# Patient Record
Sex: FEMALE | Race: BLACK OR AFRICAN AMERICAN | Employment: FULL TIME | URBAN - METROPOLITAN AREA
[De-identification: names, ages, dates, MRNs, and addresses within clinical notes are randomized per-mention and may not be internally consistent; named-entity substitution may affect disease eponyms.]

---

## 2018-12-07 ENCOUNTER — OFFICE VISIT (OUTPATIENT)
Dept: FAMILY MEDICINE CLINIC | Age: 43
End: 2018-12-07

## 2018-12-07 VITALS
SYSTOLIC BLOOD PRESSURE: 131 MMHG | HEART RATE: 60 BPM | WEIGHT: 185 LBS | HEIGHT: 63 IN | TEMPERATURE: 97.5 F | BODY MASS INDEX: 32.78 KG/M2 | DIASTOLIC BLOOD PRESSURE: 72 MMHG

## 2018-12-07 DIAGNOSIS — G43.109 MIGRAINE WITH AURA AND WITHOUT STATUS MIGRAINOSUS, NOT INTRACTABLE: Primary | ICD-10-CM

## 2018-12-07 DIAGNOSIS — G43.909 MIGRAINE WITHOUT STATUS MIGRAINOSUS, NOT INTRACTABLE, UNSPECIFIED MIGRAINE TYPE: ICD-10-CM

## 2018-12-07 RX ORDER — BUTALBITAL, ACETAMINOPHEN AND CAFFEINE 50; 325; 40 MG/1; MG/1; MG/1
1 TABLET ORAL
Qty: 20 TAB | Refills: 0 | Status: SHIPPED | OUTPATIENT
Start: 2018-12-07 | End: 2019-11-06

## 2018-12-07 NOTE — PROGRESS NOTES
Assessment/Plan:       ICD-10-CM ICD-9-CM    1. Migraine with aura and without status migrainosus, not intractable G43.109 346.00    2. Migraine without status migrainosus, not intractable, unspecified migraine type G43.909 346.90 butalbital-acetaminophen-caffeine (FIORICET, ESGIC) -40 mg per tablet     Follow-up Disposition:  Return if symptoms worsen or fail to improve - to [facility] ER for signs/symptoms stroke as discussed. [facility], [address] [place] [address]  [address] [id number]  Phone: [phone] Fax: [phone]      [facility]  Subjective:     Chief Complaint   Patient presents with    Head Pain     pt c/o right side head and eye intermittent pain x 1 month    Other     please check depression screening    Zach Sigala is a 39 y.o. BLACK OR  female. HPI: Eye painful, sometimes turns red. No head injury. Treated for headaches that wouldn  t go away 9 years ago. She is in school. Her headaches went away for a year. 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 Maybe sleeps 5 hours. A week ago, had to get up very early at 3 am.  Felt tired. Took pain pills on Saturday. Sunday, headache not going away. Eye pain now 4/10. Pain has gone up to 7-8/10. Does not affect her vision. No n/v  Light doesn't bother her. Last used tylenol. No recent change in coffee. Review of Systems: Negative for: fever, chest pain, shortness of breath, leg swelling, exertional dyspnea, palpitations. Current Medications:   Current Outpatient Medications on File Prior to Visit   Medication Sig    butalbital-acetaminophen-caffeine (FIORICET, ESGIC) -40 mg per tablet Take 1 Tab by mouth every six (6) hours as needed for Pain. Max Daily Amount: 4 Tabs.  naproxen (NAPROSYN) 500 mg tablet Take 1 Tab by mouth two (2) times daily (with meals).     cyclobenzaprine (FLEXERIL) 10 mg tablet Take 1 Tab by mouth nightly. Social History: She  reports that  has never smoked. she has never used smokeless tobacco. She reports that she does not drink alcohol or use drugs. Objective:     Vitals:    12/07/18 0858   BP: 131/72   Pulse: 60   Temp: 97.5 °F (36.4 °C)   TempSrc: Oral   Weight: 185 lb (83.9 kg)   Height: 5' 2.99\" (1.6 m)    Patient's last menstrual period was 12/05/2018. Wt Readings from Last 2 Encounters:   12/07/18 185 lb (83.9 kg)   07/13/15 167 lb (75.8 kg)     Physical Examination:  General appearance - well developed, no acute distress. Chest - clear to auscultation. Heart - regular rate and rhythm without murmurs, rubs, or gallops. Abdomen - bowel sounds present x 4, NT, ND. Extremities - pulses intact. No peripheral edema. CN II-XII normal.  Assessment/Plan:   Diagnoses and all orders for this visit:    1. Migraine with aura and without status migrainosus, not intractable    2. Migraine without status migrainosus, not intractable, unspecified migraine type  -     butalbital-acetaminophen-caffeine (FIORICET, ESGIC) -40 mg per tablet; Take 1 Tab by mouth every six (6) hours as needed for Pain. Follow-up Disposition:  Return if symptoms worsen or fail to improve - to [facility] ER for signs/symptoms stroke as discussed. Kirti Le DNP, FNP-BC, BC-ADM  [address] 51 expressed understanding of this plan.

## 2019-11-06 ENCOUNTER — HOSPITAL ENCOUNTER (EMERGENCY)
Age: 44
Discharge: HOME | End: 2019-11-06
Attending: EMERGENCY MEDICINE | Admitting: EMERGENCY MEDICINE
Payer: SELF-PAY

## 2019-11-06 ENCOUNTER — APPOINTMENT (OUTPATIENT)
Dept: CT IMAGING | Age: 44
End: 2019-11-06
Attending: NURSE PRACTITIONER
Payer: SELF-PAY

## 2019-11-06 VITALS
RESPIRATION RATE: 15 BRPM | HEART RATE: 65 BPM | SYSTOLIC BLOOD PRESSURE: 107 MMHG | OXYGEN SATURATION: 100 % | TEMPERATURE: 98.1 F | DIASTOLIC BLOOD PRESSURE: 81 MMHG

## 2019-11-06 DIAGNOSIS — G43.909 MIGRAINE WITHOUT STATUS MIGRAINOSUS, NOT INTRACTABLE, UNSPECIFIED MIGRAINE TYPE: ICD-10-CM

## 2019-11-06 DIAGNOSIS — R51.9 ACUTE NONINTRACTABLE HEADACHE, UNSPECIFIED HEADACHE TYPE: Primary | ICD-10-CM

## 2019-11-06 LAB
ANION GAP SERPL CALC-SCNC: 5 MMOL/L (ref 5–15)
BASOPHILS # BLD: 0 K/UL (ref 0–0.1)
BASOPHILS NFR BLD: 0 % (ref 0–1)
BUN SERPL-MCNC: 16 MG/DL (ref 6–20)
BUN/CREAT SERPL: 16 (ref 12–20)
CALCIUM SERPL-MCNC: 8.6 MG/DL (ref 8.5–10.1)
CHLORIDE SERPL-SCNC: 107 MMOL/L (ref 97–108)
CO2 SERPL-SCNC: 24 MMOL/L (ref 21–32)
COMMENT, HOLDF: NORMAL
CREAT SERPL-MCNC: 1.02 MG/DL (ref 0.55–1.02)
DIFFERENTIAL METHOD BLD: NORMAL
EOSINOPHIL # BLD: 0.4 K/UL (ref 0–0.4)
EOSINOPHIL NFR BLD: 5 % (ref 0–7)
ERYTHROCYTE [DISTWIDTH] IN BLOOD BY AUTOMATED COUNT: 14.5 % (ref 11.5–14.5)
GLUCOSE SERPL-MCNC: 157 MG/DL (ref 65–100)
HCT VFR BLD AUTO: 36.3 % (ref 35–47)
HGB BLD-MCNC: 11.5 G/DL (ref 11.5–16)
IMM GRANULOCYTES # BLD AUTO: 0 K/UL (ref 0–0.04)
IMM GRANULOCYTES NFR BLD AUTO: 0 % (ref 0–0.5)
LYMPHOCYTES # BLD: 2.3 K/UL (ref 0.8–3.5)
LYMPHOCYTES NFR BLD: 30 % (ref 12–49)
MAGNESIUM SERPL-MCNC: 2 MG/DL (ref 1.6–2.4)
MCH RBC QN AUTO: 27.3 PG (ref 26–34)
MCHC RBC AUTO-ENTMCNC: 31.7 G/DL (ref 30–36.5)
MCV RBC AUTO: 86.2 FL (ref 80–99)
MONOCYTES # BLD: 0.4 K/UL (ref 0–1)
MONOCYTES NFR BLD: 5 % (ref 5–13)
NEUTS SEG # BLD: 4.6 K/UL (ref 1.8–8)
NEUTS SEG NFR BLD: 60 % (ref 32–75)
NRBC # BLD: 0 K/UL (ref 0–0.01)
NRBC BLD-RTO: 0 PER 100 WBC
PLATELET # BLD AUTO: 269 K/UL (ref 150–400)
PMV BLD AUTO: 11 FL (ref 8.9–12.9)
POTASSIUM SERPL-SCNC: 3.7 MMOL/L (ref 3.5–5.1)
RBC # BLD AUTO: 4.21 M/UL (ref 3.8–5.2)
SAMPLES BEING HELD,HOLD: NORMAL
SODIUM SERPL-SCNC: 136 MMOL/L (ref 136–145)
WBC # BLD AUTO: 7.7 K/UL (ref 3.6–11)

## 2019-11-06 PROCEDURE — 80048 BASIC METABOLIC PNL TOTAL CA: CPT

## 2019-11-06 PROCEDURE — 96375 TX/PRO/DX INJ NEW DRUG ADDON: CPT

## 2019-11-06 PROCEDURE — 99283 EMERGENCY DEPT VISIT LOW MDM: CPT

## 2019-11-06 PROCEDURE — 85025 COMPLETE CBC W/AUTO DIFF WBC: CPT

## 2019-11-06 PROCEDURE — 96374 THER/PROPH/DIAG INJ IV PUSH: CPT

## 2019-11-06 PROCEDURE — 83735 ASSAY OF MAGNESIUM: CPT

## 2019-11-06 PROCEDURE — 70450 CT HEAD/BRAIN W/O DYE: CPT

## 2019-11-06 PROCEDURE — 74011250636 HC RX REV CODE- 250/636: Performed by: NURSE PRACTITIONER

## 2019-11-06 PROCEDURE — 36415 COLL VENOUS BLD VENIPUNCTURE: CPT

## 2019-11-06 RX ORDER — KETOROLAC TROMETHAMINE 30 MG/ML
15 INJECTION, SOLUTION INTRAMUSCULAR; INTRAVENOUS
Status: COMPLETED | OUTPATIENT
Start: 2019-11-06 | End: 2019-11-06

## 2019-11-06 RX ORDER — ONDANSETRON 2 MG/ML
4 INJECTION INTRAMUSCULAR; INTRAVENOUS
Status: COMPLETED | OUTPATIENT
Start: 2019-11-06 | End: 2019-11-06

## 2019-11-06 RX ORDER — BUTALBITAL, ACETAMINOPHEN AND CAFFEINE 50; 325; 40 MG/1; MG/1; MG/1
1 TABLET ORAL
Qty: 20 TAB | Refills: 0 | Status: SHIPPED | OUTPATIENT
Start: 2019-11-06

## 2019-11-06 RX ADMIN — KETOROLAC TROMETHAMINE 15 MG: 30 INJECTION, SOLUTION INTRAMUSCULAR at 19:21

## 2019-11-06 RX ADMIN — SODIUM CHLORIDE 1000 ML: 900 INJECTION, SOLUTION INTRAVENOUS at 19:20

## 2019-11-06 RX ADMIN — ONDANSETRON 4 MG: 2 INJECTION INTRAMUSCULAR; INTRAVENOUS at 19:21

## 2019-11-06 NOTE — ED TRIAGE NOTES
Triage: Pt arrives from work with CC of pain and redness to the right eye as well as headache behind the eye and on that side of the head. Pt reports history of migraines. Denies any vision problems.  Denies N/V.

## 2019-11-06 NOTE — ED PROVIDER NOTES
Initial Complaint: headache, right eye redness    Started: today    Endorses: migraines are mostly in the back but over the last year it has been behind the right eye. Has been seen by Brooke and given \"pain killers\". Has been seen at [facility] for eye exam and her exam was normal.  Denies: H/O vision problems, N/V, photophobia. Made better: nothing  Made worse: when bending over, turning head to the right    No further complaints. No past medical history on file. Past Surgical History:  2010: HX GYN      Comment:  still birth  200: HX TUBAL LIGATION  Reviewed      Primary care provider: No primary care provider on file. The history is provided by the patient. No  was used. No past medical history on file. Past Surgical History:   Procedure Laterality Date    HX GYN  2010    still birth    HX TUBAL LIGATION  2010     No family history on file.     Social History     Socioeconomic History    Marital status:      Spouse name: Not on file    Number of children: Not on file    Years of education: Not on file    Highest education level: Not on file   Occupational History    Not on file   Social Needs    Financial resource strain: Not on file    Food insecurity:     Worry: Not on file     Inability: Not on file    Transportation needs:     Medical: Not on file     Non-medical: Not on file   Tobacco Use    Smoking status: Never Smoker    Smokeless tobacco: Never Used   Substance and Sexual Activity    Alcohol use: No    Drug use: No    Sexual activity: Yes     Partners: Male   Lifestyle    Physical activity:     Days per week: Not on file     Minutes per session: Not on file    Stress: Not on file   Relationships    Social connections:     Talks on phone: Not on file     Gets together: Not on file     Attends Taoist service: Not on file     Active member of club or organization: Not on file     Attends meetings of clubs or organizations: Not on file Relationship status: Not on file    Intimate partner violence:     Fear of current or ex partner: Not on file     Emotionally abused: Not on file     Physically abused: Not on file     Forced sexual activity: Not on file   Other Topics Concern    Not on file   Social History Narrative    Not on file     ALLERGIES: Patient has no known allergies. Review of Systems   Constitutional: Negative for chills and fever. Eyes: Positive for pain, redness and visual disturbance. Negative for photophobia. Gastrointestinal: Negative for nausea and vomiting. Neurological: Positive for headaches. Negative for dizziness and light-headedness. Psychiatric/Behavioral: Negative. All other systems reviewed and are negative. Vitals:    11/06/19 1718   BP: 128/76   Pulse: 85   Resp: 16   Temp: 97.8 °F (36.6 °C)   SpO2: 100%          Physical Exam   Constitutional: She is oriented to person, place, and time. Vital signs are normal. She appears well-developed and well-nourished. She is active and cooperative. Non-toxic appearance. She does not have a sickly appearance. She does not appear ill. No distress. HENT:   Head: Normocephalic and atraumatic. Eyes: Pupils are equal, round, and reactive to light. EOM and lids are normal. Right conjunctiva is injected. Right conjunctiva has no hemorrhage. Left conjunctiva is not injected. Left conjunctiva has no hemorrhage. Right eye: Injected to the lateral eye. No medial injection. NOT a sub-conjunctival hemorrhage   Neck: Trachea normal, normal range of motion and phonation normal. Neck supple. Cardiovascular: Normal rate, regular rhythm, normal heart sounds and intact distal pulses. Pulses:       Radial pulses are 2+ on the right side, and 2+ on the left side. Pulmonary/Chest: Effort normal and breath sounds normal. No stridor. No respiratory distress. She has no decreased breath sounds. She has no wheezes. She has no rhonchi. She has no rales.  She exhibits no tenderness. Abdominal: Soft. Normal appearance and bowel sounds are normal. There is no tenderness. There is no rigidity, no rebound, no guarding and no CVA tenderness. Musculoskeletal: Normal range of motion. Lymphadenopathy:        Head (right side): No submental, no submandibular, no tonsillar and no preauricular adenopathy present. Head (left side): No submental, no submandibular, no tonsillar and no preauricular adenopathy present. She has no cervical adenopathy. Neurological: She is alert and oriented to person, place, and time. She has normal strength. No cranial nerve deficit or sensory deficit. She displays a negative Romberg sign. GCS eye subscore is 4. GCS verbal subscore is 5. GCS motor subscore is 6. Non-focal neuro exam. Negative finger/nose and heel/shin. Negative pronator drift. Skin: Skin is warm and dry. No erythema. Psychiatric: She has a normal mood and affect. Her behavior is normal. Judgment and thought content normal.   Nursing note and vitals reviewed. MDM       Procedures      Assessment & Plan:     Orders Placed This Encounter    CT HEAD WO CONT    Hold Sample    CBC WITH AUTOMATED DIFF    MAGNESIUM    METABOLIC PANEL, BASIC    INSERT PERIPHERAL IV ONE TIME STAT    sodium chloride 0.9 % bolus infusion 1,000 mL    ondansetron (ZOFRAN) injection 4 mg    ketorolac (TORADOL) injection 15 mg       Discussed with Carolee Pepper MD,ED Provider    Joanie Larkin NP  11/06/19  6:09 PM    Pain now 5/10 from 10/10. Labs and imaging without acute findings. Follow-up with PCP. Discussed return precautions. 8:46 PM  Patient re-evaluated. All questions answered. Patient appropriate for discharge. Given return precautions and follow up instructions.      LABORATORY TESTS:  Labs Reviewed   METABOLIC PANEL, BASIC - Abnormal; Notable for the following components:       Result Value    Glucose 157 (*)     GFR est non-AA 59 (*)     All other components within normal limits   SAMPLES BEING HELD   CBC WITH AUTOMATED DIFF   MAGNESIUM       IMAGING RESULTS:  CT HEAD WO CONT   Final Result   IMPRESSION: No acute intracranial findings. Sinus disease. MEDICATIONS GIVEN:  Medications   sodium chloride 0.9 % bolus infusion 1,000 mL (0 mL IntraVENous IV Completed 11/6/19 2043)   ondansetron (ZOFRAN) injection 4 mg (4 mg IntraVENous Given 11/6/19 1921)   ketorolac (TORADOL) injection 15 mg (15 mg IntraVENous Given 11/6/19 1921)       IMPRESSION:  1. Acute nonintractable headache, unspecified headache type    2. Migraine without status migrainosus, not intractable, unspecified migraine type        PLAN:  1. Discharge Medication List as of 11/6/2019  8:43 PM      CONTINUE these medications which have CHANGED    Details   butalbital-acetaminophen-caffeine (FIORICET, ESGIC) -40 mg per tablet Take 1 Tab by mouth every six (6) hours as needed for Pain. Indications: a migraine headache, Tension Headache, Print, Disp-20 Tab, R-0         CONTINUE these medications which have NOT CHANGED    Details   naproxen (NAPROSYN) 500 mg tablet Take 1 Tab by mouth two (2) times daily (with meals). , Normal, Disp-20 Tab, R-0      cyclobenzaprine (FLEXERIL) 10 mg tablet Take 1 Tab by mouth nightly., Normal, Disp-15 Tab, R-0           2. Follow-up Information     Follow up With Specialties Details [address]  Schedule an appointment as soon as possible for a visit Primary care referral [phone]  [address] [facility] [place]  Schedule an appointment as soon as possible for a visit  [address] [facility] [address] [place] [address]  [phone]    [facility], [place] [address] [facility] [address] DEP Emergency Medicine  As needed, If symptoms worsen [address] [facility]  [phone]        3.      Return to ED for new or worsening symptoms       Ray Fernandez NP                  Please note that this dictation was completed with Dragon, the computer voice recognition software. Quite often unanticipated grammatical, syntax, homophones, and other interpretive errors are inadvertently transcribed by the computer software. Please disregard these errors. Please excuse any errors that have escaped final proofreading.

## 2019-11-07 NOTE — DISCHARGE INSTRUCTIONS
Thank you for allowing us to care for you today. Please follow-up with your Primary Care provider in the next 2-3 days if your symptoms do not improve. Plan for home:     Fioricet every 6 hours as needed for headache. Follow-up with Neurology. See below for more information. Come back to the ER if you have worsening symptoms, fevers over 100.9, shaking chills, nausea or vomiting. Patient Education        Headache: Care Instructions  Your Care Instructions    Headaches have many possible causes. Most headaches aren't a sign of a more serious problem, and they will get better on their own. Home treatment may help you feel better faster. The doctor has checked you carefully, but problems can develop later. If you notice any problems or new symptoms, get medical treatment right away. Follow-up care is a key part of your treatment and safety. Be sure to make and go to all appointments, and call your doctor if you are having problems. It's also a good idea to know your test results and keep a list of the medicines you take. How can you care for yourself at home? · Do not drive if you have taken a prescription pain medicine. · Rest in a quiet, dark room until your headache is gone. Close your eyes and try to relax or go to sleep. Don't watch TV or read. · Put a cold, moist cloth or cold pack on the painful area for 10 to 20 minutes at a time. Put a thin cloth between the cold pack and your skin. · Use a warm, moist towel or a heating pad set on low to relax tight shoulder and neck muscles. · Have someone gently massage your neck and shoulders. · Take pain medicines exactly as directed. ? If the doctor gave you a prescription medicine for pain, take it as prescribed. ? If you are not taking a prescription pain medicine, ask your doctor if you can take an over-the-counter medicine.   · Be careful not to take pain medicine more often than the instructions allow, because you may get worse or more frequent headaches when the medicine wears off. · Do not ignore new symptoms that occur with a headache, such as a fever, weakness or numbness, vision changes, or confusion. These may be signs of a more serious problem. To prevent headaches  · Keep a headache diary so you can figure out what triggers your headaches. Avoiding triggers may help you prevent headaches. Record when each headache began, how long it lasted, and what the pain was like (throbbing, aching, stabbing, or dull). Write down any other symptoms you had with the headache, such as nausea, flashing lights or dark spots, or sensitivity to bright light or loud noise. Note if the headache occurred near your period. List anything that might have triggered the headache, such as certain foods (chocolate, cheese, wine) or odors, smoke, bright light, stress, or lack of sleep. · Find healthy ways to deal with stress. Headaches are most common during or right after stressful times. Take time to relax before and after you do something that has caused a headache in the past.  · Try to keep your muscles relaxed by keeping good posture. Check your jaw, face, neck, and shoulder muscles for tension, and try relaxing them. When sitting at a desk, change positions often, and stretch for 30 seconds each hour. · Get plenty of sleep and exercise. · Eat regularly and well. Long periods without food can trigger a headache. · Treat yourself to a massage. Some people find that regular massages are very helpful in relieving tension. · Limit caffeine by not drinking too much coffee, tea, or soda. But don't quit caffeine suddenly, because that can also give you headaches. · Reduce eyestrain from computers by blinking frequently and looking away from the computer screen every so often. Make sure you have proper eyewear and that your monitor is set up properly, about an arm's length away. · Seek help if you have depression or anxiety.  Your headaches may be linked to these conditions. Treatment can both prevent headaches and help with symptoms of anxiety or depression. When should you call for help? Call 911 anytime you think you may need emergency care. For example, call if:    · You have signs of a stroke. These may include:  ? Sudden numbness, paralysis, or weakness in your face, arm, or leg, especially on only one side of your body. ? Sudden vision changes. ? Sudden trouble speaking. ? Sudden confusion or trouble understanding simple statements. ? Sudden problems with walking or balance. ? A sudden, severe headache that is different from past headaches.    Call your doctor now or seek immediate medical care if:    · You have a new or worse headache.     · Your headache gets much worse. Where can you learn more? Go to http://jules-enoch.info/. Enter M271 in the search box to learn more about \"Headache: Care Instructions. \"  Current as of: March 28, 2019  Content Version: 12.2  © 7005-1541 [employer], [employer]. Care instructions adapted under license by [employer] (which disclaims liability or warranty for this information). If you have questions about a medical condition or this instruction, always ask your healthcare professional. [place] [address] any warranty or liability for your use of this information.

## 2019-11-07 NOTE — ED NOTES
2000: Fluids continuing to infuse. 2043: Patient verbalizes understanding of discharge instructions given by provider, Josiah Choi 34. Opportunity for questions provided. Patient in no apparent distress. PIV removed. Patient ambulatory upon discharge.    Visit Vitals  /81 (BP 1 Location: Left arm, BP Patient Position: At rest;Sitting)   Pulse 65   Temp 98.1 °F (36.7 °C)   Resp 15   SpO2 100%

## 2021-05-17 ENCOUNTER — HOSPITAL ENCOUNTER (EMERGENCY)
Age: 46
Discharge: HOME | End: 2021-05-17
Attending: EMERGENCY MEDICINE | Admitting: EMERGENCY MEDICINE
Payer: COMMERCIAL

## 2021-05-17 VITALS
TEMPERATURE: 97 F | WEIGHT: 183 LBS | OXYGEN SATURATION: 98 % | HEIGHT: 63 IN | BODY MASS INDEX: 32.43 KG/M2 | SYSTOLIC BLOOD PRESSURE: 137 MMHG | HEART RATE: 78 BPM | RESPIRATION RATE: 18 BRPM | DIASTOLIC BLOOD PRESSURE: 101 MMHG

## 2021-05-17 DIAGNOSIS — H10.31 ACUTE CONJUNCTIVITIS OF RIGHT EYE, UNSPECIFIED ACUTE CONJUNCTIVITIS TYPE: ICD-10-CM

## 2021-05-17 DIAGNOSIS — H20.9 UVEITIS: Primary | ICD-10-CM

## 2021-05-17 PROCEDURE — 74011000250 HC RX REV CODE- 250: Performed by: EMERGENCY MEDICINE

## 2021-05-17 PROCEDURE — 99284 EMERGENCY DEPT VISIT MOD MDM: CPT

## 2021-05-17 PROCEDURE — 74011250637 HC RX REV CODE- 250/637: Performed by: EMERGENCY MEDICINE

## 2021-05-17 RX ORDER — ACETAMINOPHEN 500 MG
1000 TABLET ORAL
Status: COMPLETED | OUTPATIENT
Start: 2021-05-17 | End: 2021-05-17

## 2021-05-17 RX ORDER — IBUPROFEN 400 MG/1
800 TABLET ORAL
Status: COMPLETED | OUTPATIENT
Start: 2021-05-17 | End: 2021-05-17

## 2021-05-17 RX ORDER — ERYTHROMYCIN 5 MG/G
OINTMENT OPHTHALMIC
Qty: 1 TUBE | Refills: 0 | Status: SHIPPED | OUTPATIENT
Start: 2021-05-17

## 2021-05-17 RX ORDER — TETRACAINE HYDROCHLORIDE 5 MG/ML
2 SOLUTION OPHTHALMIC
Status: COMPLETED | OUTPATIENT
Start: 2021-05-17 | End: 2021-05-17

## 2021-05-17 RX ORDER — ERYTHROMYCIN 5 MG/G
OINTMENT OPHTHALMIC
Status: DISCONTINUED | OUTPATIENT
Start: 2021-05-17 | End: 2021-05-17

## 2021-05-17 RX ADMIN — TETRACAINE HYDROCHLORIDE 2 DROP: 5 SOLUTION OPHTHALMIC at 06:36

## 2021-05-17 RX ADMIN — ACETAMINOPHEN 1000 MG: 500 TABLET ORAL at 06:55

## 2021-05-17 RX ADMIN — IBUPROFEN 800 MG: 400 TABLET, FILM COATED ORAL at 06:55

## 2021-05-17 RX ADMIN — FLUORESCEIN SODIUM 1 STRIP: 1 STRIP OPHTHALMIC at 06:55

## 2021-05-17 NOTE — ED TRIAGE NOTES
Pt presents to the ED with cc of severe headache and right eye pain that started this morning. Pt denies vision changes.

## 2021-05-18 NOTE — ED PROVIDER NOTES
79-year-old female presenting ER with initial complaint of headache but truly actually right eye pain. Patient reports right eye pain with redness. No loss of vision. Patient denies any trauma injuries or foreign bodies or foreign body sensation. Denies any eye discharge. No fevers or chills. Does not wear contacts or glasses. No history of autoimmune disorders. Reports photophobia. Mild headache associated around the eye but no generalized headache. No neck stiffness or rigidity. No fevers or chills or nausea or vomiting. Does not seeing eye doctor regularly. No history of glaucoma or family history of glaucoma. No past medical history on file. Past Surgical History:   Procedure Laterality Date    HX GYN  2010    still birth    HX TUBAL LIGATION  2010         No family history on file.     Social History     Socioeconomic History    Marital status:      Spouse name: Not on file    Number of children: Not on file    Years of education: Not on file    Highest education level: Not on file   Occupational History    Not on file   Social Needs    Financial resource strain: Not on file    Food insecurity     Worry: Not on file     Inability: Not on file    Transportation needs     Medical: Not on file     Non-medical: Not on file   Tobacco Use    Smoking status: Never Smoker    Smokeless tobacco: Never Used   Substance and Sexual Activity    Alcohol use: No    Drug use: No    Sexual activity: Yes     Partners: Male   Lifestyle    Physical activity     Days per week: Not on file     Minutes per session: Not on file    Stress: Not on file   Relationships    Social connections     Talks on phone: Not on file     Gets together: Not on file     Attends Anabaptism service: Not on file     Active member of club or organization: Not on file     Attends meetings of clubs or organizations: Not on file     Relationship status: Not on file    Intimate partner violence     Fear of current or ex partner: Not on file     Emotionally abused: Not on file     Physically abused: Not on file     Forced sexual activity: Not on file   Other Topics Concern    Not on file   Social History Narrative    Not on file         ALLERGIES: Patient has no known allergies. Review of Systems   Constitutional: Negative for fatigue and fever. HENT: Negative for congestion, ear discharge, ear pain, facial swelling, rhinorrhea, sinus pressure, sinus pain and sore throat. Eyes: Positive for photophobia, pain and redness. Respiratory: Negative for shortness of breath. Cardiovascular: Negative for chest pain. Gastrointestinal: Negative for abdominal pain, nausea and vomiting. Neurological: Negative for headaches. All other systems reviewed and are negative. Vitals:    05/17/21 0608   BP: (!) 137/101   Pulse: 78   Resp: 18   Temp: 97 °F (36.1 °C)   SpO2: 98%   Weight: 83 kg (183 lb)   Height: 5' 3\" (1.6 m)            Physical Exam  Constitutional:       Appearance: Normal appearance. HENT:      Head: Normocephalic. Nose: Nose normal.      Mouth/Throat:      Pharynx: Oropharynx is clear. Eyes:      General: Lids are normal. Lids are everted, no foreign bodies appreciated. Vision grossly intact. Right eye: No foreign body or discharge. Intraocular pressure: Right eye pressure is 16 mmHg. Extraocular Movements: Extraocular movements intact. Conjunctiva/sclera:      Right eye: Right conjunctiva is injected (with limbic sparing). No chemosis, exudate or hemorrhage. Left eye: Left conjunctiva is not injected. No chemosis, exudate or hemorrhage. Pupils: Pupils are equal, round, and reactive to light. Right eye: Pupil is not sluggish. No corneal abrasion or fluorescein uptake. Liliana exam negative. Slit lamp exam:     Right eye: Photophobia present. No corneal ulcer, foreign body, hyphema or hypopyon.       Left eye: No foreign body, hyphema, hypopyon or photophobia. Neck:      Musculoskeletal: Neck supple. Cardiovascular:      Rate and Rhythm: Normal rate. Pulmonary:      Effort: Pulmonary effort is normal. No respiratory distress. Musculoskeletal: Normal range of motion. Skin:     General: Skin is warm. Capillary Refill: Capillary refill takes less than 2 seconds. Neurological:      General: No focal deficit present. Mental Status: She is alert. MDM  Number of Diagnoses or Management Options  Acute conjunctivitis of right eye, unspecified acute conjunctivitis type  Uveitis  Diagnosis management comments: Patient with headache which is truly actually eye pain. Patient has injected cornea with limbic sparing. No hyphema or hypopyon. No foreign body seen. IOP 16 mmHg  Stain of the eye negative. Patient symptoms seem most consistent with iritis with conjunctival injections with limbic sparing and photophobia. However due to redness will start patient on erythromycin eye ointment. No signs of glaucoma. No loss of vision normal visual acuity. Will have patient follow-up with her eye doctor. Eye Stain      Performed by: attending        Corneal abrasion was not present on eyelid eversion. Cornea is not clear. Anterior chamber is not clear. Patient tolerance: patient tolerated the procedure well with no immediate complications  My total time at bedside, performing this procedure was 1-15 minutes.

## 2023-05-18 RX ORDER — NAPROXEN 500 MG/1
500 TABLET ORAL 2 TIMES DAILY WITH MEALS
COMMUNITY
Start: 2015-06-02

## 2023-05-18 RX ORDER — ERYTHROMYCIN 5 MG/G
OINTMENT OPHTHALMIC
COMMUNITY
Start: 2021-05-17

## 2023-05-18 RX ORDER — CYCLOBENZAPRINE HCL 10 MG
10 TABLET ORAL
COMMUNITY
Start: 2015-06-02

## 2023-05-18 RX ORDER — BUTALBITAL, ACETAMINOPHEN AND CAFFEINE 50; 325; 40 MG/1; MG/1; MG/1
1 TABLET ORAL EVERY 6 HOURS PRN
COMMUNITY
Start: 2019-11-06